# Patient Record
Sex: MALE | Race: WHITE
[De-identification: names, ages, dates, MRNs, and addresses within clinical notes are randomized per-mention and may not be internally consistent; named-entity substitution may affect disease eponyms.]

---

## 2019-03-13 ENCOUNTER — HOSPITAL ENCOUNTER (INPATIENT)
Dept: HOSPITAL 54 - ER | Age: 84
LOS: 1 days | Discharge: HOME | DRG: 638 | End: 2019-03-14
Attending: INTERNAL MEDICINE | Admitting: NURSE PRACTITIONER
Payer: MEDICARE

## 2019-03-13 VITALS — BODY MASS INDEX: 24.05 KG/M2 | HEIGHT: 69 IN | WEIGHT: 162.37 LBS

## 2019-03-13 DIAGNOSIS — Z86.73: ICD-10-CM

## 2019-03-13 DIAGNOSIS — I10: ICD-10-CM

## 2019-03-13 DIAGNOSIS — E11.649: Primary | ICD-10-CM

## 2019-03-13 DIAGNOSIS — Z95.0: ICD-10-CM

## 2019-03-13 DIAGNOSIS — D68.59: ICD-10-CM

## 2019-03-13 DIAGNOSIS — R53.1: ICD-10-CM

## 2019-03-13 DIAGNOSIS — Z79.01: ICD-10-CM

## 2019-03-13 DIAGNOSIS — Z95.1: ICD-10-CM

## 2019-03-13 DIAGNOSIS — I48.91: ICD-10-CM

## 2019-03-13 DIAGNOSIS — I25.10: ICD-10-CM

## 2019-03-13 DIAGNOSIS — R55: ICD-10-CM

## 2019-03-13 LAB
ALBUMIN SERPL BCP-MCNC: 3.8 G/DL (ref 3.4–5)
ALP SERPL-CCNC: 96 U/L (ref 46–116)
ALT SERPL W P-5'-P-CCNC: 40 U/L (ref 12–78)
APPEARANCE UR: CLEAR
AST SERPL W P-5'-P-CCNC: 46 U/L (ref 15–37)
BASOPHILS # BLD AUTO: 0.1 /CMM (ref 0–0.2)
BASOPHILS NFR BLD AUTO: 0.9 % (ref 0–2)
BILIRUB DIRECT SERPL-MCNC: 0 MG/DL (ref 0–0.2)
BILIRUB SERPL-MCNC: 0.4 MG/DL (ref 0.2–1)
BILIRUB UR QL STRIP: NEGATIVE
BUN SERPL-MCNC: 18 MG/DL (ref 7–18)
CALCIUM SERPL-MCNC: 9.3 MG/DL (ref 8.5–10.1)
CHLORIDE SERPL-SCNC: 103 MMOL/L (ref 98–107)
CK SERPL-CCNC: 278 U/L (ref 39–308)
CO2 SERPL-SCNC: 28 MMOL/L (ref 21–32)
COLOR UR: YELLOW
CREAT SERPL-MCNC: 1 MG/DL (ref 0.6–1.3)
EOSINOPHIL NFR BLD AUTO: 4.4 % (ref 0–6)
GLUCOSE SERPL-MCNC: 129 MG/DL (ref 74–106)
GLUCOSE UR STRIP-MCNC: NEGATIVE MG/DL
HCT VFR BLD AUTO: 41 % (ref 39–51)
HGB BLD-MCNC: 13.9 G/DL (ref 13.5–17.5)
HGB UR QL STRIP: (no result) ERY/UL
KETONES UR STRIP-MCNC: NEGATIVE MG/DL
LEUKOCYTE ESTERASE UR QL STRIP: NEGATIVE
LIPASE SERPL-CCNC: 118 U/L (ref 73–393)
LYMPHOCYTES NFR BLD AUTO: 1.6 /CMM (ref 0.8–4.8)
LYMPHOCYTES NFR BLD AUTO: 24.2 % (ref 20–44)
MCHC RBC AUTO-ENTMCNC: 34 G/DL (ref 31–36)
MCV RBC AUTO: 95 FL (ref 80–96)
MONOCYTES NFR BLD AUTO: 0.6 /CMM (ref 0.1–1.3)
MONOCYTES NFR BLD AUTO: 9.1 % (ref 2–12)
NEUTROPHILS # BLD AUTO: 4 /CMM (ref 1.8–8.9)
NEUTROPHILS NFR BLD AUTO: 61.4 % (ref 43–81)
NITRITE UR QL STRIP: NEGATIVE
PH UR STRIP: 6.5 [PH] (ref 5–8)
PHOSPHATE SERPL-MCNC: 2.5 MG/DL (ref 2.5–4.9)
PLATELET # BLD AUTO: 230 /CMM (ref 150–450)
POTASSIUM SERPL-SCNC: 4.1 MMOL/L (ref 3.5–5.1)
PREALB SERPL-MCNC: 27.8 MG/DL (ref 18–35.7)
PROT SERPL-MCNC: 7.6 G/DL (ref 6.4–8.2)
PROT UR QL STRIP: NEGATIVE MG/DL
RBC # BLD AUTO: 4.33 MIL/UL (ref 4.5–6)
RBC #/AREA URNS HPF: (no result) /HPF (ref 0–2)
SODIUM SERPL-SCNC: 138 MMOL/L (ref 136–145)
UROBILINOGEN UR STRIP-MCNC: 0.2 EU/DL
WBC #/AREA URNS HPF: (no result) /HPF (ref 0–3)
WBC NRBC COR # BLD AUTO: 6.5 K/UL (ref 4.3–11)

## 2019-03-13 PROCEDURE — G0378 HOSPITAL OBSERVATION PER HR: HCPCS

## 2019-03-13 NOTE — NUR
PT ETIENNE COMPLAINING OF DIZZINESS SINCE 8AM THIS MORNINING. NO DIZZINESS AT THE 
MOMENT. PT AXO4. RESPIRATIONS EVEN AND UNLABORED. PT PUT ON THE MONITOR AND 
PULSE OX. PENDING EVAL FROM T[facility]AMA.

## 2019-03-14 VITALS — SYSTOLIC BLOOD PRESSURE: 164 MMHG | DIASTOLIC BLOOD PRESSURE: 81 MMHG

## 2019-03-14 VITALS — SYSTOLIC BLOOD PRESSURE: 148 MMHG | DIASTOLIC BLOOD PRESSURE: 74 MMHG

## 2019-03-14 VITALS — DIASTOLIC BLOOD PRESSURE: 81 MMHG | SYSTOLIC BLOOD PRESSURE: 144 MMHG

## 2019-03-14 VITALS — DIASTOLIC BLOOD PRESSURE: 77 MMHG | SYSTOLIC BLOOD PRESSURE: 149 MMHG

## 2019-03-14 VITALS — DIASTOLIC BLOOD PRESSURE: 87 MMHG | SYSTOLIC BLOOD PRESSURE: 152 MMHG

## 2019-03-14 LAB
ALBUMIN SERPL BCP-MCNC: 3.6 G/DL (ref 3.4–5)
ALP SERPL-CCNC: 90 U/L (ref 46–116)
ALT SERPL W P-5'-P-CCNC: 38 U/L (ref 12–78)
AST SERPL W P-5'-P-CCNC: 28 U/L (ref 15–37)
BASOPHILS # BLD AUTO: 0.1 /CMM (ref 0–0.2)
BASOPHILS NFR BLD AUTO: 0.8 % (ref 0–2)
BILIRUB SERPL-MCNC: 0.3 MG/DL (ref 0.2–1)
BUN SERPL-MCNC: 17 MG/DL (ref 7–18)
CALCIUM SERPL-MCNC: 9.1 MG/DL (ref 8.5–10.1)
CHLORIDE SERPL-SCNC: 105 MMOL/L (ref 98–107)
CHOLEST SERPL-MCNC: 119 MG/DL (ref ?–200)
CO2 SERPL-SCNC: 27 MMOL/L (ref 21–32)
CREAT SERPL-MCNC: 0.9 MG/DL (ref 0.6–1.3)
EOSINOPHIL NFR BLD AUTO: 4 % (ref 0–6)
GLUCOSE SERPL-MCNC: 101 MG/DL (ref 74–106)
HCT VFR BLD AUTO: 41 % (ref 39–51)
HDLC SERPL-MCNC: 39 MG/DL (ref 40–60)
HGB BLD-MCNC: 14 G/DL (ref 13.5–17.5)
LDLC SERPL DIRECT ASSAY-MCNC: 66 MG/DL (ref 0–99)
LYMPHOCYTES NFR BLD AUTO: 1.5 /CMM (ref 0.8–4.8)
LYMPHOCYTES NFR BLD AUTO: 21.5 % (ref 20–44)
MAGNESIUM SERPL-MCNC: 2.1 MG/DL (ref 1.8–2.4)
MCHC RBC AUTO-ENTMCNC: 34 G/DL (ref 31–36)
MCV RBC AUTO: 94 FL (ref 80–96)
MONOCYTES NFR BLD AUTO: 0.6 /CMM (ref 0.1–1.3)
MONOCYTES NFR BLD AUTO: 8.5 % (ref 2–12)
NEUTROPHILS # BLD AUTO: 4.7 /CMM (ref 1.8–8.9)
NEUTROPHILS NFR BLD AUTO: 65.2 % (ref 43–81)
PHOSPHATE SERPL-MCNC: 2.7 MG/DL (ref 2.5–4.9)
PLATELET # BLD AUTO: 204 /CMM (ref 150–450)
POTASSIUM SERPL-SCNC: 3.8 MMOL/L (ref 3.5–5.1)
PROT SERPL-MCNC: 7.2 G/DL (ref 6.4–8.2)
RBC # BLD AUTO: 4.35 MIL/UL (ref 4.5–6)
SODIUM SERPL-SCNC: 141 MMOL/L (ref 136–145)
TRIGL SERPL-MCNC: 115 MG/DL (ref 30–150)
TSH SERPL DL<=0.005 MIU/L-ACNC: 3.22 UIU/ML (ref 0.36–3.74)
WBC NRBC COR # BLD AUTO: 7.2 K/UL (ref 4.3–11)

## 2019-03-14 RX ADMIN — APIXABAN SCH MG: 5 TABLET, FILM COATED ORAL at 16:57

## 2019-03-14 RX ADMIN — APIXABAN SCH MG: 5 TABLET, FILM COATED ORAL at 09:28

## 2019-03-14 RX ADMIN — Medication SCH EACH: at 05:24

## 2019-03-14 RX ADMIN — Medication SCH EACH: at 17:32

## 2019-03-14 RX ADMIN — Medication SCH EACH: at 09:21

## 2019-03-14 RX ADMIN — Medication SCH EACH: at 03:38

## 2019-03-14 RX ADMIN — Medication SCH EACH: at 13:24

## 2019-03-14 NOTE — NUR
RN TELE OPENING NOTES



PATIENT RECEIVED AWAKE IN BED IN NO ACUTE SIGNS OF DISTRESS. A/O X4. VERBALLY RESPONSIVE, 
DENIES PAIN OR ANY DISCOMFORTS AT THIS TIME. ABLE TO MOVE ALL EXTREMITIES WITHOUT WEAKNESS 
OR DRIFT. ON ROOM AIR, BREATHING EVEN AND UNLABORED. NO SOB NOTED. TELEMONITORING SHOWS 
ATRIAL PACING WITH HR ON THE 70'S, NO C/O CARDIAC DISTRESS VOICED.. IV ACCESS ON RIGHT HAND 
AND RAC BOTH INTACT AND PATENT, IVF OF NS @ 50ML/HR INFUSING WELL. SKIN DRY AND WARM TO 
TOUCH. AFEBRILE. SAFETY AND SEIZURE PRECAUTIONS MAINTAINED. BED IN LOW LOCKED POSITION WITH 
SR UP X2. CALL LIGHT WITHIN REACH. WILL CONTINUE TO MONITOR ACCORDINGLY.

## 2019-03-14 NOTE — NUR
MS RN CLOSING NOTES



PATIENT AWAKE AND RESTING IN BED. A/O X4. ABLE TO VERBALIZED NEEDS. ON ROOM AIR, BREATHING 
EVEN AND UNLABORED, NO SOB NOTED. ALL NEEDS AND CARE ATTENDED WELL. PT FOR DISCHARGE 
TONIGHT, AWAITING FRIEND NAMED SHANTELLE TO PICK HIM UP. BOTH IV ACCESSES REMOVED WITH NO 
BLEEDING NOTED. CALL LIGHT IN REACH. ENDORSED TO NIGHT SHIFT NURSE.

## 2019-03-14 NOTE — NUR
TELE RN NOTE



PATIENT IN STABLE CONDITION THROUGHOUT SHIFT. PT IS A/O X4.  RESPIRATIONS EVEN AND UNLABORED 
WITH NO S/S OF SOB OR ACUTE DISTRESS.  PATIENT ON TELE MONITORING, ATRIAL PACING IN 70S.  
SKIN ASSESSMENT DONE UPON ADMISSION. I20G RHAND NS@50ML/HR PATENT AND INTACT. PATIENT IS 
ABLE TO AMBULATE. SAFETY MEASURES IN PLACE, BED IN LOW LOCKED POSITION WITH SIDE RAILS UP 
X3.  CALL LIGHT WITHIN REACH.  WILL ENDORSE TO ONCOMING NURSE FOR CONTINUATION OF CARE.

## 2019-03-14 NOTE — NUR
MS RN

RECEIVE PT IN BED. A/O X 3. AWAITING DISCHARGE, RESPIRATIONS EVEN AND UNLABORED, NO SOB 
NOTED, NO DISTRESS, SAFETY MEASURES IN PLACE. WILL CONTINUE TO MONITOR.

-------------------------------------------------------------------------------

Addendum: 03/14/19 at 2038 by DERIC BARROW RN

-------------------------------------------------------------------------------

SALMA XIE RN PT, FOR DISCHARGE

## 2019-03-14 NOTE — NUR
TELE RN ADMITTING NOTE



RECEIVED PATIENT FROM ER VIA [facility] IN STABLE CONDITION WITH FAMILY AT BEDSIDE. PT IS A/O 
X4.  RESPIRATIONS EVEN AND UNLABORED WITH NO S/S OF SOB OR ACUTE DISTRESS.  PATIENT WAS 
PLACED ON TELE MONITORING.  SKIN ASSESSMENT DONE UPON ADMISSION. IV SITE ON RAC 18G SL, 20G 
RHAND NS@50ML/HR PATENT AND INTACT. PATIENT IS ABLE TO AMBULATE. SAFETY MEASURES WERE 
APPLIED, BED IN LOW LOCKED POSITION WITH SIDE RAILS UP X3.  CALL LIGHT WITHIN REACH.  WILL 
CONTINUE TO MONITOR.

## 2019-03-15 ENCOUNTER — HOSPITAL ENCOUNTER (INPATIENT)
Dept: HOSPITAL 54 - ER | Age: 84
LOS: 4 days | Discharge: NURSING, REHAB OR LONG-TERM CARE | DRG: 66 | End: 2019-03-19
Attending: FAMILY MEDICINE | Admitting: NURSE PRACTITIONER
Payer: MEDICARE

## 2019-03-15 VITALS — WEIGHT: 162.5 LBS | HEIGHT: 69 IN | BODY MASS INDEX: 24.07 KG/M2

## 2019-03-15 DIAGNOSIS — I63.9: Primary | ICD-10-CM

## 2019-03-15 DIAGNOSIS — Z79.01: ICD-10-CM

## 2019-03-15 DIAGNOSIS — Z95.1: ICD-10-CM

## 2019-03-15 DIAGNOSIS — R26.9: ICD-10-CM

## 2019-03-15 DIAGNOSIS — Z91.81: ICD-10-CM

## 2019-03-15 DIAGNOSIS — Z95.0: ICD-10-CM

## 2019-03-15 DIAGNOSIS — I25.10: ICD-10-CM

## 2019-03-15 DIAGNOSIS — Z86.73: ICD-10-CM

## 2019-03-15 DIAGNOSIS — N40.0: ICD-10-CM

## 2019-03-15 DIAGNOSIS — I48.0: ICD-10-CM

## 2019-03-15 DIAGNOSIS — Z79.82: ICD-10-CM

## 2019-03-15 DIAGNOSIS — I10: ICD-10-CM

## 2019-03-15 DIAGNOSIS — E11.9: ICD-10-CM

## 2019-03-15 LAB
ALBUMIN SERPL BCP-MCNC: 3.6 G/DL (ref 3.4–5)
ALP SERPL-CCNC: 91 U/L (ref 46–116)
ALT SERPL W P-5'-P-CCNC: 37 U/L (ref 12–78)
AMMONIA PLAS-SCNC: 23 UMOL/L (ref 11–32)
APPEARANCE UR: CLEAR
AST SERPL W P-5'-P-CCNC: 26 U/L (ref 15–37)
BASOPHILS # BLD AUTO: 0 /CMM (ref 0–0.2)
BASOPHILS NFR BLD AUTO: 0.5 % (ref 0–2)
BILIRUB DIRECT SERPL-MCNC: 0.1 MG/DL (ref 0–0.2)
BILIRUB SERPL-MCNC: 0.4 MG/DL (ref 0.2–1)
BILIRUB UR QL STRIP: NEGATIVE
BUN SERPL-MCNC: 25 MG/DL (ref 7–18)
CALCIUM SERPL-MCNC: 9.4 MG/DL (ref 8.5–10.1)
CHLORIDE SERPL-SCNC: 103 MMOL/L (ref 98–107)
CO2 SERPL-SCNC: 23 MMOL/L (ref 21–32)
COLOR UR: YELLOW
CREAT SERPL-MCNC: 1.1 MG/DL (ref 0.6–1.3)
EOSINOPHIL NFR BLD AUTO: 2.6 % (ref 0–6)
GLUCOSE SERPL-MCNC: 206 MG/DL (ref 74–106)
GLUCOSE UR STRIP-MCNC: NEGATIVE MG/DL
HCT VFR BLD AUTO: 41 % (ref 39–51)
HGB BLD-MCNC: 14 G/DL (ref 13.5–17.5)
HGB UR QL STRIP: (no result) ERY/UL
KETONES UR STRIP-MCNC: NEGATIVE MG/DL
LEUKOCYTE ESTERASE UR QL STRIP: NEGATIVE
LYMPHOCYTES NFR BLD AUTO: 1.5 /CMM (ref 0.8–4.8)
LYMPHOCYTES NFR BLD AUTO: 19.2 % (ref 20–44)
MCHC RBC AUTO-ENTMCNC: 34 G/DL (ref 31–36)
MCV RBC AUTO: 95 FL (ref 80–96)
MONOCYTES NFR BLD AUTO: 0.6 /CMM (ref 0.1–1.3)
MONOCYTES NFR BLD AUTO: 7.8 % (ref 2–12)
NEUTROPHILS # BLD AUTO: 5.3 /CMM (ref 1.8–8.9)
NEUTROPHILS NFR BLD AUTO: 69.9 % (ref 43–81)
NITRITE UR QL STRIP: NEGATIVE
PH UR STRIP: 6 [PH] (ref 5–8)
PLATELET # BLD AUTO: 222 /CMM (ref 150–450)
POTASSIUM SERPL-SCNC: 4.4 MMOL/L (ref 3.5–5.1)
PROT SERPL-MCNC: 7.3 G/DL (ref 6.4–8.2)
PROT UR QL STRIP: NEGATIVE MG/DL
RBC # BLD AUTO: 4.34 MIL/UL (ref 4.5–6)
RBC #/AREA URNS HPF: (no result) /HPF (ref 0–2)
SODIUM SERPL-SCNC: 137 MMOL/L (ref 136–145)
TSH SERPL DL<=0.005 MIU/L-ACNC: 1.83 UIU/ML (ref 0.36–3.74)
UROBILINOGEN UR STRIP-MCNC: 0.2 EU/DL
WBC #/AREA URNS HPF: (no result) /HPF (ref 0–3)
WBC NRBC COR # BLD AUTO: 7.7 K/UL (ref 4.3–11)

## 2019-03-15 PROCEDURE — G0378 HOSPITAL OBSERVATION PER HR: HCPCS

## 2019-03-15 NOTE — NUR
PT BIBDAUGHTER C/O "UNSTEADY ON FEET" X 2 DAYS. PATIENT DISCHARGED FROM HERE ON 
WEDNESDAY. PT DENIES PAIN, STATES "I JUST FEEL UNSTEADY ON MY FEET". PT AOX4. 
NAD NOTED. RESP EVEN AND UNLABORED. DAUGHTER AT BEDSIDE. ALL NEEDS MET AT THIS 
TIME. PT ON MONITOR IN BED 11. WILL CONTINUE TO MONITOR.

## 2019-03-16 VITALS — SYSTOLIC BLOOD PRESSURE: 160 MMHG | DIASTOLIC BLOOD PRESSURE: 76 MMHG

## 2019-03-16 VITALS — DIASTOLIC BLOOD PRESSURE: 77 MMHG | SYSTOLIC BLOOD PRESSURE: 143 MMHG

## 2019-03-16 VITALS — SYSTOLIC BLOOD PRESSURE: 130 MMHG | DIASTOLIC BLOOD PRESSURE: 71 MMHG

## 2019-03-16 VITALS — DIASTOLIC BLOOD PRESSURE: 76 MMHG | SYSTOLIC BLOOD PRESSURE: 166 MMHG

## 2019-03-16 RX ADMIN — Medication SCH EACH: at 06:36

## 2019-03-16 RX ADMIN — Medication SCH MG: at 23:31

## 2019-03-16 RX ADMIN — SODIUM CHLORIDE PRN MLS/HR: 9 INJECTION, SOLUTION INTRAVENOUS at 01:57

## 2019-03-16 RX ADMIN — SIMVASTATIN SCH MG: 40 TABLET, FILM COATED ORAL at 21:56

## 2019-03-16 RX ADMIN — Medication SCH EACH: at 18:02

## 2019-03-16 RX ADMIN — RAMIPRIL SCH MG: 5 CAPSULE ORAL at 22:00

## 2019-03-16 RX ADMIN — Medication SCH EACH: at 21:56

## 2019-03-16 RX ADMIN — Medication SCH EACH: at 12:43

## 2019-03-16 NOTE — NUR
RN OPENING NOTES



RECEIVED PATIENT AWAKE RESTING IN BED. A/OX3-4, ABLE TO MAKE NEEDS KNOWN. NOT IN ANY FORM OF 
DISTRESS. NO SOB. DENIED PAIN OR DISCOMFORT. NEURO CHECK DONE, NO FACIAL DROOP, SPEECH IS 
CLEAR, NO HEADACHE, NO BLURRED VISION, NO WEAKNESS. NO CHANGE FROM BASELINE NOTED PER 
PATIENT. IV ACCES INTACT AND PATENT. SAFETY MEASURES INITIATED. BED IN LOW/LOCKED POSITION, 
SIDERAILSUPX2, CALL LIGHT IN REACH. WILL CONTINUE TO MONIOTR ACCORDINGLY .

## 2019-03-16 NOTE — NUR
TELE RN NOTES



PATIENT AWAKE IN BED WITH NO DISTRESS NOTED. CALL LIGHT WITHIN REACH. NO C/O PAIN OR 
DISCOMFORT. NEURO CHECKS Q4 HRS RENDERED WITH NO CHANGES TO BASELINE. PERIPHERAL LINE INTACT 
AND PATENT. PATIENT REMAINS IN STABLE CONDITION. BED IN LOW LOCK SETTING. ALL BELONGINGS 
NEAR BEDSIDE. WILL ENDORSE TO ONCOMING SHIFT.

## 2019-03-16 NOTE — NUR
RN CLOSING NOTES



PATIENT IN STABLE CONDITION. ALL NEEDS ATTENDED AND PROVIDED. ALL DUE MEDICATIONS 
ADMINISTERED AS ORDERED. KEPT PATIENT SAFE AND COMFORTABLE. BED IN LOWEST/LOCKED POSITION, 
SIDERAILS UPX3, CALL LIGHT IN REACH. ENDORSED TO NIGHT RN FOR MAJOR.

## 2019-03-16 NOTE — NUR
TELE RN NOTES 



RECEIVED PATIENT AWAKE IN BED AND WATCHING TV. NO DISTRESS NOTED. CALL LIGHT WITHIN REACH. 
PATIENT REMAINS A/O X4. NO C/O PAIN OR DISCOMFORT. PERIPHERAL LINE INTACT AND PATENT. BED IN 
LOW LOCK SETTING. ALL BELONGINGS KEPT NEAR BEDSIDE. WILL CONTINUE TO MONITOR.

## 2019-03-16 NOTE — NUR
RECEIVED PATIENT VIA GURNEY FROM ED IN STABLE CONDITION. PATIENT ACCOMPANIED BY DTR. PATIENT 
A/OX4 AND ABLE TO VERBALIZE NEEDS. NO C/O PAIN OR DISCOMFORT. NO SLURRED SPEECH, CHEST PAIN, 
DIZZINESS, HA NOTED. PERIPHERAL LINE INTACT AND PATENT. STROKE ASSESSMENT AND SWALLOW EVAL 
RENDERED AND TOLERATED WELL. ENCOURAGED USE OF CALL LIGHT FOR ASSISTANCE AND VERBALIZED GOOD 
UNDERSTANDING. BED IN LOW LOCK SETTING. ROOM FREE OF CLUTTER AND BELONGINGS KEPT NEAR 
BEDSIDE. WILL CONTINUE TO MONITOR.

## 2019-03-17 VITALS — SYSTOLIC BLOOD PRESSURE: 165 MMHG | DIASTOLIC BLOOD PRESSURE: 93 MMHG

## 2019-03-17 VITALS — DIASTOLIC BLOOD PRESSURE: 73 MMHG | SYSTOLIC BLOOD PRESSURE: 151 MMHG

## 2019-03-17 VITALS — SYSTOLIC BLOOD PRESSURE: 137 MMHG | DIASTOLIC BLOOD PRESSURE: 69 MMHG

## 2019-03-17 VITALS — SYSTOLIC BLOOD PRESSURE: 135 MMHG | DIASTOLIC BLOOD PRESSURE: 74 MMHG

## 2019-03-17 VITALS — DIASTOLIC BLOOD PRESSURE: 69 MMHG | SYSTOLIC BLOOD PRESSURE: 137 MMHG

## 2019-03-17 VITALS — SYSTOLIC BLOOD PRESSURE: 123 MMHG | DIASTOLIC BLOOD PRESSURE: 69 MMHG

## 2019-03-17 VITALS — DIASTOLIC BLOOD PRESSURE: 76 MMHG | SYSTOLIC BLOOD PRESSURE: 148 MMHG

## 2019-03-17 LAB
BASOPHILS # BLD AUTO: 0.1 /CMM (ref 0–0.2)
BASOPHILS NFR BLD AUTO: 0.8 % (ref 0–2)
BUN SERPL-MCNC: 17 MG/DL (ref 7–18)
CALCIUM SERPL-MCNC: 9.1 MG/DL (ref 8.5–10.1)
CHLORIDE SERPL-SCNC: 106 MMOL/L (ref 98–107)
CO2 SERPL-SCNC: 28 MMOL/L (ref 21–32)
CREAT SERPL-MCNC: 0.8 MG/DL (ref 0.6–1.3)
EOSINOPHIL NFR BLD AUTO: 3.2 % (ref 0–6)
GLUCOSE SERPL-MCNC: 109 MG/DL (ref 74–106)
HCT VFR BLD AUTO: 41 % (ref 39–51)
HGB BLD-MCNC: 13.9 G/DL (ref 13.5–17.5)
LYMPHOCYTES NFR BLD AUTO: 1.8 /CMM (ref 0.8–4.8)
LYMPHOCYTES NFR BLD AUTO: 22.8 % (ref 20–44)
MAGNESIUM SERPL-MCNC: 1.9 MG/DL (ref 1.8–2.4)
MCHC RBC AUTO-ENTMCNC: 34 G/DL (ref 31–36)
MCV RBC AUTO: 95 FL (ref 80–96)
MONOCYTES NFR BLD AUTO: 0.5 /CMM (ref 0.1–1.3)
MONOCYTES NFR BLD AUTO: 7.1 % (ref 2–12)
NEUTROPHILS # BLD AUTO: 5.1 /CMM (ref 1.8–8.9)
NEUTROPHILS NFR BLD AUTO: 66.1 % (ref 43–81)
PHOSPHATE SERPL-MCNC: 3.2 MG/DL (ref 2.5–4.9)
PLATELET # BLD AUTO: 196 /CMM (ref 150–450)
POTASSIUM SERPL-SCNC: 4 MMOL/L (ref 3.5–5.1)
RBC # BLD AUTO: 4.34 MIL/UL (ref 4.5–6)
SODIUM SERPL-SCNC: 142 MMOL/L (ref 136–145)
WBC NRBC COR # BLD AUTO: 7.7 K/UL (ref 4.3–11)

## 2019-03-17 RX ADMIN — SIMVASTATIN SCH MG: 40 TABLET, FILM COATED ORAL at 21:39

## 2019-03-17 RX ADMIN — Medication SCH EACH: at 12:09

## 2019-03-17 RX ADMIN — Medication SCH EACH: at 17:32

## 2019-03-17 RX ADMIN — PANTOPRAZOLE SODIUM SCH MG: 40 TABLET, DELAYED RELEASE ORAL at 08:13

## 2019-03-17 RX ADMIN — RAMIPRIL SCH MG: 5 CAPSULE ORAL at 21:40

## 2019-03-17 RX ADMIN — TAMSULOSIN HYDROCHLORIDE SCH MG: 0.4 CAPSULE ORAL at 09:13

## 2019-03-17 RX ADMIN — SOTALOL HYDROCHLORIDE SCH MG: 80 TABLET ORAL at 21:41

## 2019-03-17 RX ADMIN — ATORVASTATIN CALCIUM SCH MG: 40 TABLET, FILM COATED ORAL at 09:13

## 2019-03-17 RX ADMIN — APIXABAN SCH MG: 5 TABLET, FILM COATED ORAL at 17:32

## 2019-03-17 RX ADMIN — Medication SCH MG: at 09:13

## 2019-03-17 RX ADMIN — Medication SCH EACH: at 22:32

## 2019-03-17 RX ADMIN — Medication SCH EACH: at 06:34

## 2019-03-17 RX ADMIN — Medication SCH MG: at 21:41

## 2019-03-17 RX ADMIN — SOTALOL HYDROCHLORIDE SCH MG: 80 TABLET ORAL at 12:09

## 2019-03-17 RX ADMIN — METOPROLOL TARTRATE SCH MG: 50 TABLET, FILM COATED ORAL at 21:40

## 2019-03-17 RX ADMIN — SODIUM CHLORIDE PRN MLS/HR: 9 INJECTION, SOLUTION INTRAVENOUS at 06:34

## 2019-03-17 RX ADMIN — ASPIRIN 81 MG SCH MG: 81 TABLET ORAL at 09:13

## 2019-03-17 RX ADMIN — APIXABAN SCH MG: 5 TABLET, FILM COATED ORAL at 09:13

## 2019-03-17 NOTE — NUR
MS RN

RECEIVED ON BED, AWAKE,ALERT,ORIENTED X4,NOT IN ANY FORM OF DISTRESS, RESPIRATIONS EVEN AND 
UNLABORED,NO SOB NOTED, LUNGS ARE DIMINISHED,ABDOMEN SOFT,POSITIVE BOWEL SOUNDS, DENIES PAIN 
AT THIS TIME, WILL MONITOR accordingly.

## 2019-03-17 NOTE — NUR
TELE RN NOTES



PATIENT AWAKE IN BED WITH NO DISTRESS NOTED. CALL LIGHT WITHIN REACH. NO C/O PAIN OR 
DISCOMFORT. PATIENT REMAINS IN STABLE CONDITION WITH NO CHANGES TO BASELINE. PERIPHERAL LINE 
INTACT AND PATENT. BED IN LOW LOCK SETTING. ALL BELONGINGS NEAR BEDSIDE. WILL ENDORSE TO 
ONCOMING SHIFT.

## 2019-03-17 NOTE — NUR
MS RN

RECEIVED ON BED, AWAKE,ALERT,ORIENTED X4,NOT IN ANY FORM OF DISTRESS, RESPIRATIONS EVEN AND 
UNLABORED,NO SOB NOTED, LUNGS ARE DIMINISHED,ABDOMEN SOFT,POSITIVE BOWEL SOUNDS, DENIES PAIN 
AT THIS TIME, WILL MONITOR PATIENT'S CONDITION.

## 2019-03-17 NOTE — NUR
MS RN

SPOKE W/ DR. ANDUJAR REGARDING BS - 157 - THERE WAS AN INSTRUCTIONS TO CALL MD IF BS - 
ABOVE 157 - NO ORDER PER MD.

## 2019-03-18 VITALS — DIASTOLIC BLOOD PRESSURE: 70 MMHG | SYSTOLIC BLOOD PRESSURE: 120 MMHG

## 2019-03-18 VITALS — DIASTOLIC BLOOD PRESSURE: 76 MMHG | SYSTOLIC BLOOD PRESSURE: 134 MMHG

## 2019-03-18 VITALS — DIASTOLIC BLOOD PRESSURE: 68 MMHG | SYSTOLIC BLOOD PRESSURE: 111 MMHG

## 2019-03-18 VITALS — DIASTOLIC BLOOD PRESSURE: 68 MMHG | SYSTOLIC BLOOD PRESSURE: 121 MMHG

## 2019-03-18 VITALS — DIASTOLIC BLOOD PRESSURE: 68 MMHG | SYSTOLIC BLOOD PRESSURE: 112 MMHG

## 2019-03-18 LAB
BASOPHILS # BLD AUTO: 0.1 /CMM (ref 0–0.2)
BASOPHILS NFR BLD AUTO: 1.1 % (ref 0–2)
BUN SERPL-MCNC: 27 MG/DL (ref 7–18)
CALCIUM SERPL-MCNC: 9.1 MG/DL (ref 8.5–10.1)
CHLORIDE SERPL-SCNC: 106 MMOL/L (ref 98–107)
CO2 SERPL-SCNC: 25 MMOL/L (ref 21–32)
CREAT SERPL-MCNC: 0.9 MG/DL (ref 0.6–1.3)
EOSINOPHIL NFR BLD AUTO: 4.1 % (ref 0–6)
GLUCOSE SERPL-MCNC: 119 MG/DL (ref 74–106)
HCT VFR BLD AUTO: 40 % (ref 39–51)
HGB BLD-MCNC: 13.5 G/DL (ref 13.5–17.5)
LYMPHOCYTES NFR BLD AUTO: 1.7 /CMM (ref 0.8–4.8)
LYMPHOCYTES NFR BLD AUTO: 22.4 % (ref 20–44)
MCHC RBC AUTO-ENTMCNC: 34 G/DL (ref 31–36)
MCV RBC AUTO: 94 FL (ref 80–96)
MONOCYTES NFR BLD AUTO: 0.6 /CMM (ref 0.1–1.3)
MONOCYTES NFR BLD AUTO: 8.4 % (ref 2–12)
NEUTROPHILS # BLD AUTO: 4.8 /CMM (ref 1.8–8.9)
NEUTROPHILS NFR BLD AUTO: 64 % (ref 43–81)
PLATELET # BLD AUTO: 211 /CMM (ref 150–450)
POTASSIUM SERPL-SCNC: 4.1 MMOL/L (ref 3.5–5.1)
RBC # BLD AUTO: 4.22 MIL/UL (ref 4.5–6)
SODIUM SERPL-SCNC: 140 MMOL/L (ref 136–145)
WBC NRBC COR # BLD AUTO: 7.4 K/UL (ref 4.3–11)

## 2019-03-18 RX ADMIN — Medication SCH MG: at 21:12

## 2019-03-18 RX ADMIN — APIXABAN SCH MG: 5 TABLET, FILM COATED ORAL at 16:32

## 2019-03-18 RX ADMIN — Medication SCH EACH: at 07:39

## 2019-03-18 RX ADMIN — SOTALOL HYDROCHLORIDE SCH MG: 80 TABLET ORAL at 21:16

## 2019-03-18 RX ADMIN — APIXABAN SCH MG: 5 TABLET, FILM COATED ORAL at 08:39

## 2019-03-18 RX ADMIN — RAMIPRIL SCH MG: 5 CAPSULE ORAL at 22:22

## 2019-03-18 RX ADMIN — Medication SCH EACH: at 16:31

## 2019-03-18 RX ADMIN — TAMSULOSIN HYDROCHLORIDE SCH MG: 0.4 CAPSULE ORAL at 08:39

## 2019-03-18 RX ADMIN — PANTOPRAZOLE SODIUM SCH MG: 40 TABLET, DELAYED RELEASE ORAL at 08:38

## 2019-03-18 RX ADMIN — METOPROLOL TARTRATE SCH MG: 50 TABLET, FILM COATED ORAL at 08:39

## 2019-03-18 RX ADMIN — ATORVASTATIN CALCIUM SCH MG: 40 TABLET, FILM COATED ORAL at 08:39

## 2019-03-18 RX ADMIN — SIMVASTATIN SCH MG: 40 TABLET, FILM COATED ORAL at 21:12

## 2019-03-18 RX ADMIN — ASPIRIN 81 MG SCH MG: 81 TABLET ORAL at 08:39

## 2019-03-18 RX ADMIN — SOTALOL HYDROCHLORIDE SCH MG: 80 TABLET ORAL at 08:38

## 2019-03-18 RX ADMIN — Medication SCH EACH: at 11:23

## 2019-03-18 RX ADMIN — Medication SCH EACH: at 22:20

## 2019-03-18 RX ADMIN — Medication SCH MG: at 08:39

## 2019-03-18 NOTE — NUR
MS RN

PATIENT IN BED, AWAKE,ALERT,ORIENTED X4,NOT IN ANY FORM OF DISTRESS, RESPIRATIONS EVEN AND 
UNLABORED,NO SOB NOTED, LUNGS ARE DIMINISHED,ABDOMEN SOFT,POSITIVE BOWEL SOUNDS, DENIES PAIN 
AT THIS TIME, ENDORSED TO AM NURSE FOR CONTINUITY OF CARE, FOR DISCHARGE TODAY FOR 
PLACEMENT.

## 2019-03-18 NOTE — NUR
RECEIVED PT IN BED AWAKE AND ALERT. BREATHING EVENLY. NO SOB. NAD. NO FACIAL DROOP, DENIED 
H/A. NO C/O PAIN OR DISCOMFORT AT THIS TIME. TELE MONITOR IN PLACE. NEEDS ATTENDED. BED LOW 
LOCKED. CALL LIGHT WITHIN REACH, WILL CONT TO MONITOR ,

## 2019-03-18 NOTE — NUR
PATIENT'S BG . PATIENT REFUSES ANY BG MEDICATIONS. NOTIFIED DR. BALLARD PER SET 
PARAMETER. NO NEW ORDERS AT THIS TIME. CONTINUE TO MONITOR.

## 2019-03-18 NOTE — NUR
FSBS: 154. PATIENT STABLE WITH NO S/S OF HYPO OR HYPERGLYCEMIA. LUCILA PRETTY MADE AWARE BASED 
ON ORDER, NP WITH A NEW ORDER TO CHANGE THE CURRENT ORDER TO "INFORM MD FOR BS>250". NEW 
ORDER NOTED. WILL CONT TO MONITOR ,

## 2019-03-18 NOTE — NUR
TELE RN OPENING NOTE



RECEIVED PATIENT IN BED. ALERT ORIENTED X 4, ON ROOM AIR, TOLERATING WELL. IN NO APPARENT 
DISTRESS OR DISCOMFORT AT THIS TIME, RESPIRATIONS EVEN AND UNLABORED. DENIES PAIN AND SOB. 
FACE IS SYMMETRICAL, NO DROOPING PRESENT, SPEECH IS CLEAR. PATIENT IS ABLE TO VERBALIZE 
NEEDS. ON TELE MONITORING, A-PACING. LEFT HAND IVC 20G, SL, PATENT AND INTACT. PATIENT KEPT 
CLEAN AND COMFORTABLE. ALL NEEDS ATTENDED, SAFETY MEASURES IN PLACE, BED IN LOW LOCKED 
POSITION, SIDE RAILS UP X2, ANGELA LIGHT WITHIN EASY REACH. WILL CONTINUE TO MONITOR.

## 2019-03-18 NOTE — NUR
MS RN CLOSING NOTE



PATIENT IN BED. ALERT ORIENTED X 4, ON ROOM AIR, TOLERATING WELL. IN NO APPARENT DISTRESS OR 
DISCOMFORT AT THIS TIME, RESPIRATIONS EVEN AND UNLABORED. DENIES PAIN AND SOB. FACE IS 
SYMMETRICAL, NO DROOPING PRESENT, SPEECH IS CLEAR. PATIENT IS ABLE TO VERBALIZE NEEDS. LEFT 
HAND IVC 20G, SL, PATENT AND INTACT. PATIENT KEPT CLEAN AND COMFORTABLE. ALL NEEDS ATTENDED, 
SAFETY MEASURES IN PLACE, BED IN LOW LOCKED POSITION, SIDE RAILS UP X2, ANGELA LIGHT WITHIN 
EASY REACH. WILL ENDORSE TO PM NURSE FOR MAJOR.

## 2019-03-19 VITALS — DIASTOLIC BLOOD PRESSURE: 71 MMHG | SYSTOLIC BLOOD PRESSURE: 125 MMHG

## 2019-03-19 VITALS — SYSTOLIC BLOOD PRESSURE: 135 MMHG | DIASTOLIC BLOOD PRESSURE: 71 MMHG

## 2019-03-19 VITALS — DIASTOLIC BLOOD PRESSURE: 71 MMHG | SYSTOLIC BLOOD PRESSURE: 135 MMHG

## 2019-03-19 VITALS — DIASTOLIC BLOOD PRESSURE: 71 MMHG | SYSTOLIC BLOOD PRESSURE: 120 MMHG

## 2019-03-19 RX ADMIN — Medication SCH MG: at 09:01

## 2019-03-19 RX ADMIN — Medication SCH EACH: at 06:49

## 2019-03-19 RX ADMIN — TAMSULOSIN HYDROCHLORIDE SCH MG: 0.4 CAPSULE ORAL at 09:02

## 2019-03-19 RX ADMIN — Medication SCH EACH: at 12:35

## 2019-03-19 RX ADMIN — ASPIRIN 81 MG SCH MG: 81 TABLET ORAL at 09:02

## 2019-03-19 RX ADMIN — SOTALOL HYDROCHLORIDE SCH MG: 80 TABLET ORAL at 09:02

## 2019-03-19 RX ADMIN — PANTOPRAZOLE SODIUM SCH MG: 40 TABLET, DELAYED RELEASE ORAL at 09:02

## 2019-03-19 RX ADMIN — APIXABAN SCH MG: 5 TABLET, FILM COATED ORAL at 09:02

## 2019-03-19 NOTE — NUR
PT IN BED SLEEPING. AROUSES EASILY. BREATHING EVENLY. NO SOB. NO ACUTE EVENT DURING THE 
NIGHT ., A PACING ON TELE MONITOR, NO S/S OF HYPO OR HYPERGLYCEMIA. NO C/O PAIN OR 
DISCOMFORT. NEEDS ATTENDED. BED LOW LOCKED. CALL LIGHT WITHIN REACH, WILL CONT TO MONITOR 
AND WILL ENDORSE TO AM SHIFT FOR MAJOR.

## 2019-03-19 NOTE — NUR
MS RN AM NOTES



PATIENT IS ALERT ORIENTED X 4. PATIENT IS CALM AND COOPERATIVE. PATIENT IS ON ROOM AIR AND 
TOLERATING IT WELL. PATIENTS VITALS ARE WNL. PATIENTS MORNING MEDICATIONS GIVEN, PATIENT ATE 
100% OF HIS BREAKFAST. ALL PATIENTS NEEDS ATTENDED TO. BED IS LOW IN LOCKED POSITION. CALL 
LIGHT WITHIN REACH. WILL CONTINUE TO MONITOR PATIENT THROUGHOUT SHIFT.